# Patient Record
Sex: MALE | Employment: OTHER | URBAN - METROPOLITAN AREA
[De-identification: names, ages, dates, MRNs, and addresses within clinical notes are randomized per-mention and may not be internally consistent; named-entity substitution may affect disease eponyms.]

---

## 2021-03-02 LAB — TSH SERPL-ACNC: 2.8 MIU/L (ref 0.3–4.2)

## 2022-01-01 ENCOUNTER — ANESTHESIA EVENT (OUTPATIENT)
Dept: GASTROENTEROLOGY | Facility: CLINIC | Age: 77
End: 2022-01-01
Payer: COMMERCIAL

## 2022-01-01 ENCOUNTER — TELEPHONE (OUTPATIENT)
Dept: GASTROENTEROLOGY | Facility: CLINIC | Age: 77
End: 2022-01-01
Payer: COMMERCIAL

## 2022-01-01 ENCOUNTER — HOSPITAL ENCOUNTER (OUTPATIENT)
Facility: CLINIC | Age: 77
Discharge: HOME | End: 2022-02-15
Attending: INTERNAL MEDICINE | Admitting: INTERNAL MEDICINE
Payer: COMMERCIAL

## 2022-01-01 ENCOUNTER — TRANSCRIBE ORDERS (OUTPATIENT)
Dept: OTHER | Age: 77
End: 2022-01-01
Payer: COMMERCIAL

## 2022-01-01 ENCOUNTER — DOCUMENTATION ONLY (OUTPATIENT)
Dept: SURGERY | Facility: CLINIC | Age: 77
End: 2022-01-01
Payer: COMMERCIAL

## 2022-01-01 ENCOUNTER — PATIENT OUTREACH (OUTPATIENT)
Dept: SURGERY | Facility: CLINIC | Age: 77
End: 2022-01-01
Payer: COMMERCIAL

## 2022-01-01 ENCOUNTER — ANESTHESIA (OUTPATIENT)
Dept: GASTROENTEROLOGY | Facility: CLINIC | Age: 77
End: 2022-01-01
Payer: COMMERCIAL

## 2022-01-01 ENCOUNTER — TRANSFERRED RECORDS (OUTPATIENT)
Dept: HEALTH INFORMATION MANAGEMENT | Facility: CLINIC | Age: 77
End: 2022-01-01
Payer: COMMERCIAL

## 2022-01-01 ENCOUNTER — TELEPHONE (OUTPATIENT)
Dept: GASTROENTEROLOGY | Facility: CLINIC | Age: 77
End: 2022-01-01

## 2022-01-01 ENCOUNTER — MEDICAL CORRESPONDENCE (OUTPATIENT)
Dept: HEALTH INFORMATION MANAGEMENT | Facility: CLINIC | Age: 77
End: 2022-01-01
Payer: COMMERCIAL

## 2022-01-01 ENCOUNTER — PRE VISIT (OUTPATIENT)
Dept: SURGERY | Facility: CLINIC | Age: 77
End: 2022-01-01
Payer: COMMERCIAL

## 2022-01-01 ENCOUNTER — VIRTUAL VISIT (OUTPATIENT)
Dept: SURGERY | Facility: CLINIC | Age: 77
End: 2022-01-01
Attending: INTERNAL MEDICINE
Payer: COMMERCIAL

## 2022-01-01 ENCOUNTER — PATIENT OUTREACH (OUTPATIENT)
Dept: GASTROENTEROLOGY | Facility: CLINIC | Age: 77
End: 2022-01-01
Payer: COMMERCIAL

## 2022-01-01 ENCOUNTER — TELEPHONE (OUTPATIENT)
Dept: ONCOLOGY | Facility: CLINIC | Age: 77
End: 2022-01-01
Payer: COMMERCIAL

## 2022-01-01 VITALS
DIASTOLIC BLOOD PRESSURE: 62 MMHG | OXYGEN SATURATION: 97 % | TEMPERATURE: 97.9 F | SYSTOLIC BLOOD PRESSURE: 100 MMHG | HEART RATE: 70 BPM | HEIGHT: 70 IN | WEIGHT: 171.08 LBS | BODY MASS INDEX: 24.49 KG/M2 | RESPIRATION RATE: 16 BRPM

## 2022-01-01 DIAGNOSIS — Z86.0100 HISTORY OF COLONIC POLYPS: ICD-10-CM

## 2022-01-01 DIAGNOSIS — R93.3 ABNORMAL FINDING ON GI TRACT IMAGING: Primary | ICD-10-CM

## 2022-01-01 DIAGNOSIS — Z11.59 ENCOUNTER FOR SCREENING FOR OTHER VIRAL DISEASES: Primary | ICD-10-CM

## 2022-01-01 DIAGNOSIS — K86.89 PANCREATIC MASS: Primary | ICD-10-CM

## 2022-01-01 DIAGNOSIS — C25.0 MALIGNANT NEOPLASM OF HEAD OF PANCREAS (H): Primary | ICD-10-CM

## 2022-01-01 DIAGNOSIS — C25.9 PANCREATIC ADENOCARCINOMA (H): Primary | ICD-10-CM

## 2022-01-01 LAB
ALT SERPL-CCNC: 40 U/L (ref 7–55)
AST SERPL-CCNC: 33 U/L (ref 8–48)
COLONOSCOPY: NORMAL
CREATININE (EXTERNAL): 0.54 MG/DL (ref 0.74–1.35)
GFR ESTIMATED (EXTERNAL): >90 ML/MIN/BSA
GFR ESTIMATED (IF AFRICAN AMERICAN) (EXTERNAL): >90 ML/MIN/BSA
GLUCOSE (EXTERNAL): 109 MG/DL (ref 70–140)
INR (EXTERNAL): 2.2
INR PPP: 1.21 (ref 0.85–1.15)
PATH REPORT.COMMENTS IMP SPEC: ABNORMAL
PATH REPORT.COMMENTS IMP SPEC: NORMAL
PATH REPORT.COMMENTS IMP SPEC: NORMAL
PATH REPORT.COMMENTS IMP SPEC: YES
PATH REPORT.FINAL DX SPEC: ABNORMAL
PATH REPORT.FINAL DX SPEC: NORMAL
PATH REPORT.GROSS SPEC: ABNORMAL
PATH REPORT.GROSS SPEC: NORMAL
PATH REPORT.MICROSCOPIC SPEC OTHER STN: ABNORMAL
PATH REPORT.MICROSCOPIC SPEC OTHER STN: NORMAL
PATH REPORT.RELEVANT HX SPEC: ABNORMAL
PATH REPORT.RELEVANT HX SPEC: NORMAL
PHOTO IMAGE: NORMAL
POTASSIUM (EXTERNAL): 3.9 MMOL/L (ref 3.6–5.2)
UPPER EUS: NORMAL

## 2022-01-01 PROCEDURE — 88305 TISSUE EXAM BY PATHOLOGIST: CPT | Mod: TC | Performed by: INTERNAL MEDICINE

## 2022-01-01 PROCEDURE — 250N000009 HC RX 250: Performed by: ANESTHESIOLOGY

## 2022-01-01 PROCEDURE — 370N000017 HC ANESTHESIA TECHNICAL FEE, PER MIN: Performed by: INTERNAL MEDICINE

## 2022-01-01 PROCEDURE — 88305 TISSUE EXAM BY PATHOLOGIST: CPT | Mod: 26 | Performed by: PATHOLOGY

## 2022-01-01 PROCEDURE — 45380 COLONOSCOPY AND BIOPSY: CPT | Performed by: INTERNAL MEDICINE

## 2022-01-01 PROCEDURE — 258N000003 HC RX IP 258 OP 636: Performed by: ANESTHESIOLOGY

## 2022-01-01 PROCEDURE — 36415 COLL VENOUS BLD VENIPUNCTURE: CPT | Performed by: INTERNAL MEDICINE

## 2022-01-01 PROCEDURE — 88172 CYTP DX EVAL FNA 1ST EA SITE: CPT | Mod: 26 | Performed by: PATHOLOGY

## 2022-01-01 PROCEDURE — 250N000011 HC RX IP 250 OP 636: Performed by: ANESTHESIOLOGY

## 2022-01-01 PROCEDURE — 88173 CYTOPATH EVAL FNA REPORT: CPT | Mod: 26 | Performed by: PATHOLOGY

## 2022-01-01 PROCEDURE — 43242 EGD US FINE NEEDLE BX/ASPIR: CPT | Performed by: INTERNAL MEDICINE

## 2022-01-01 PROCEDURE — 43238 EGD US FINE NEEDLE BX/ASPIR: CPT | Performed by: INTERNAL MEDICINE

## 2022-01-01 PROCEDURE — 85610 PROTHROMBIN TIME: CPT | Performed by: INTERNAL MEDICINE

## 2022-01-01 PROCEDURE — 45385 COLONOSCOPY W/LESION REMOVAL: CPT | Performed by: INTERNAL MEDICINE

## 2022-01-01 PROCEDURE — 99204 OFFICE O/P NEW MOD 45 MIN: CPT | Mod: 95 | Performed by: SURGERY

## 2022-01-01 RX ORDER — NALOXONE HYDROCHLORIDE 0.4 MG/ML
0.2 INJECTION, SOLUTION INTRAMUSCULAR; INTRAVENOUS; SUBCUTANEOUS
Status: CANCELLED | OUTPATIENT
Start: 2022-01-01

## 2022-01-01 RX ORDER — ONDANSETRON 2 MG/ML
4 INJECTION INTRAMUSCULAR; INTRAVENOUS EVERY 30 MIN PRN
Status: CANCELLED | OUTPATIENT
Start: 2022-01-01

## 2022-01-01 RX ORDER — MEPERIDINE HYDROCHLORIDE 25 MG/ML
12.5 INJECTION INTRAMUSCULAR; INTRAVENOUS; SUBCUTANEOUS
Status: CANCELLED | OUTPATIENT
Start: 2022-01-01

## 2022-01-01 RX ORDER — GLYCOPYRROLATE 0.2 MG/ML
INJECTION, SOLUTION INTRAMUSCULAR; INTRAVENOUS PRN
Status: DISCONTINUED | OUTPATIENT
Start: 2022-01-01 | End: 2022-01-01

## 2022-01-01 RX ORDER — LIDOCAINE HYDROCHLORIDE 20 MG/ML
INJECTION, SOLUTION INFILTRATION; PERINEURAL PRN
Status: DISCONTINUED | OUTPATIENT
Start: 2022-01-01 | End: 2022-01-01

## 2022-01-01 RX ORDER — OXYCODONE HYDROCHLORIDE 5 MG/1
5 TABLET ORAL EVERY 4 HOURS PRN
Status: CANCELLED | OUTPATIENT
Start: 2022-01-01

## 2022-01-01 RX ORDER — ONDANSETRON 2 MG/ML
4 INJECTION INTRAMUSCULAR; INTRAVENOUS EVERY 6 HOURS PRN
Status: CANCELLED | OUTPATIENT
Start: 2022-01-01

## 2022-01-01 RX ORDER — ONDANSETRON 4 MG/1
4 TABLET, ORALLY DISINTEGRATING ORAL EVERY 30 MIN PRN
Status: CANCELLED | OUTPATIENT
Start: 2022-01-01

## 2022-01-01 RX ORDER — PROPOFOL 10 MG/ML
INJECTION, EMULSION INTRAVENOUS CONTINUOUS PRN
Status: DISCONTINUED | OUTPATIENT
Start: 2022-01-01 | End: 2022-01-01

## 2022-01-01 RX ORDER — SODIUM CHLORIDE, SODIUM LACTATE, POTASSIUM CHLORIDE, CALCIUM CHLORIDE 600; 310; 30; 20 MG/100ML; MG/100ML; MG/100ML; MG/100ML
INJECTION, SOLUTION INTRAVENOUS CONTINUOUS
Status: CANCELLED | OUTPATIENT
Start: 2022-01-01

## 2022-01-01 RX ORDER — FLUMAZENIL 0.1 MG/ML
0.2 INJECTION, SOLUTION INTRAVENOUS
Status: CANCELLED | OUTPATIENT
Start: 2022-01-01 | End: 2022-01-01

## 2022-01-01 RX ORDER — HYDROMORPHONE HCL IN WATER/PF 6 MG/30 ML
0.2 PATIENT CONTROLLED ANALGESIA SYRINGE INTRAVENOUS EVERY 5 MIN PRN
Status: CANCELLED | OUTPATIENT
Start: 2022-01-01

## 2022-01-01 RX ORDER — ONDANSETRON 4 MG/1
4 TABLET, ORALLY DISINTEGRATING ORAL EVERY 6 HOURS PRN
Status: CANCELLED | OUTPATIENT
Start: 2022-01-01

## 2022-01-01 RX ORDER — SODIUM CHLORIDE, SODIUM LACTATE, POTASSIUM CHLORIDE, CALCIUM CHLORIDE 600; 310; 30; 20 MG/100ML; MG/100ML; MG/100ML; MG/100ML
INJECTION, SOLUTION INTRAVENOUS CONTINUOUS PRN
Status: DISCONTINUED | OUTPATIENT
Start: 2022-01-01 | End: 2022-01-01

## 2022-01-01 RX ORDER — PROPOFOL 10 MG/ML
INJECTION, EMULSION INTRAVENOUS PRN
Status: DISCONTINUED | OUTPATIENT
Start: 2022-01-01 | End: 2022-01-01

## 2022-01-01 RX ORDER — ATORVASTATIN CALCIUM 20 MG/1
20 TABLET, FILM COATED ORAL DAILY
COMMUNITY

## 2022-01-01 RX ORDER — WARFARIN SODIUM 4 MG/1
4 TABLET ORAL DAILY
COMMUNITY

## 2022-01-01 RX ORDER — NALOXONE HYDROCHLORIDE 0.4 MG/ML
0.4 INJECTION, SOLUTION INTRAMUSCULAR; INTRAVENOUS; SUBCUTANEOUS
Status: CANCELLED | OUTPATIENT
Start: 2022-01-01

## 2022-01-01 RX ORDER — FENTANYL CITRATE 50 UG/ML
25 INJECTION, SOLUTION INTRAMUSCULAR; INTRAVENOUS EVERY 5 MIN PRN
Status: CANCELLED | OUTPATIENT
Start: 2022-01-01

## 2022-01-01 RX ORDER — SOTALOL HYDROCHLORIDE 80 MG/1
80 TABLET ORAL
COMMUNITY

## 2022-01-01 RX ORDER — LIDOCAINE 40 MG/G
CREAM TOPICAL
Status: DISCONTINUED | OUTPATIENT
Start: 2022-01-01 | End: 2022-01-01 | Stop reason: HOSPADM

## 2022-01-01 RX ORDER — FENTANYL CITRATE 50 UG/ML
25 INJECTION, SOLUTION INTRAMUSCULAR; INTRAVENOUS
Status: CANCELLED | OUTPATIENT
Start: 2022-01-01

## 2022-01-01 RX ADMIN — PROPOFOL 20 MG: 10 INJECTION, EMULSION INTRAVENOUS at 11:01

## 2022-01-01 RX ADMIN — GLYCOPYRROLATE 0.2 MG: 0.2 INJECTION, SOLUTION INTRAMUSCULAR; INTRAVENOUS at 10:53

## 2022-01-01 RX ADMIN — PROPOFOL 150 MCG/KG/MIN: 10 INJECTION, EMULSION INTRAVENOUS at 10:09

## 2022-01-01 RX ADMIN — PROPOFOL 20 MG: 10 INJECTION, EMULSION INTRAVENOUS at 10:53

## 2022-01-01 RX ADMIN — GLYCOPYRROLATE 0.2 MG: 0.2 INJECTION, SOLUTION INTRAMUSCULAR; INTRAVENOUS at 10:49

## 2022-01-01 RX ADMIN — TOPICAL ANESTHETIC 1 SPRAY: 200 SPRAY DENTAL; PERIODONTAL at 10:03

## 2022-01-01 RX ADMIN — PHENYLEPHRINE HYDROCHLORIDE 100 MCG: 10 INJECTION INTRAVENOUS at 10:48

## 2022-01-01 RX ADMIN — PROPOFOL 20 MG: 10 INJECTION, EMULSION INTRAVENOUS at 10:38

## 2022-01-01 RX ADMIN — PROPOFOL 50 MG: 10 INJECTION, EMULSION INTRAVENOUS at 10:10

## 2022-01-01 RX ADMIN — SODIUM CHLORIDE, POTASSIUM CHLORIDE, SODIUM LACTATE AND CALCIUM CHLORIDE: 600; 310; 30; 20 INJECTION, SOLUTION INTRAVENOUS at 10:08

## 2022-01-01 RX ADMIN — LIDOCAINE HYDROCHLORIDE 60 MG: 20 INJECTION, SOLUTION INFILTRATION; PERINEURAL at 10:03

## 2022-01-01 ASSESSMENT — ENCOUNTER SYMPTOMS: DYSRHYTHMIAS: 1

## 2022-01-01 ASSESSMENT — MIFFLIN-ST. JEOR: SCORE: 1522.25

## 2022-01-01 ASSESSMENT — COPD QUESTIONNAIRES: COPD: 1

## 2022-02-04 NOTE — CONFIDENTIAL NOTE
Advanced Endoscopy     Referring provider: Dr. Adam Ahn @ [facility] Ph: [phone]    Referred to: Advanced Endoscopy Provider Group     Provider Requested: Xu     Referral Received: 2/4/22     Records received: Media tab  CT 2/2/22  Impression:  1. Highly suspicous, approximately 4.6cm X 4.5cm low attenuation irregular shaped mass spicentered within the pancreatic head/uncinate process which partially encases portions right sided aspect of the SMA and adjacent proximal splenic vein. Moderate prominence of the mid, distal pancreatic duct. This is highly concerning for probable pancreatic neoplasm    2. Low - attenuation lobular is soft tissue mass measuring approximately 1.5cm X 1.5cm along superior posterior aspect of the left prostate labs concerning for possible developing prostate neoplasm.    3. Located within the proximal portion of the ascending colon there is a segment of colon which is moderately circumferentially narrowed without discrete mass, extending over approximately a 3-4cm length, favor probable decompressed lumen alternatively possible developing colonic neoplasm is difficult to confidently exclude. If clinically indicated could consider correlation with contemporaneous colonoscopy      Images received: PACs    Evaluation for: pancreas mass     Clinical History (per RN review):     MD review date:   MD Decision for clinic consultation/Orders:            Referral updates/Patient contacted:

## 2022-02-04 NOTE — PROGRESS NOTES
Called to discuss referral with patient, spoke with wife. Wife states that the patient is not symptomatic other then he has had some weight loss recently. Dr Norris referral to Dr Mcnair for possible EUS.     Explained they can expect a call from  for date and time of procedure, will need a , someone to stay with them for 24 hours and should stay in town for 24 hours (within 45 min of Hospital) post procedure    Patient needs to get pre-op physical completed. If outside  health system will need physical faxed to number [phone]   If you do not get a preop physical, your procedure could be cancelled, patient voiced understanding*    Preop Plan: Dr Garcia at [place], recent office visit/annual physical exam - being faxed    Med Review    Blood thinner -  Warfarin, guidelines to hold 5 days prior to procedure. Pt has a cardiology appt on 2/7 and will ask for guidance on holding/bridging  ASA -   Diabetic -     COVID test discussed: yes, needed within 4 days of procedure    Patient Education r/t procedure: letter to be sent to home, address confirmed    Waiting to hear from Dr Mcnair on official plan per referral.     Pt wife is wondering if his colonoscopy is also due if that can be done at the same time. He is currently scheduled on 2/24 at [facility] Dr Enriquez.    Soumya Moreno, RN, BSN,   Advanced Gastroenterology  Care coordinator

## 2022-02-07 NOTE — PROGRESS NOTES
Called pt and spoke with him and his wife.  EUS and colonoscopy with MAC in [facility] with Dr Mcnair, date of 2/15 available     Pt saw Gena Max, his cardiologist today, but did not get an answer for the warfarin or the bridging. Called made to [facility] cardiology regarding the warfarin, ok to hold for 5 days per his PCP. No mention of bridging needed. Cardiology clinic agreed with these recommendations EUS and colon ordered, message routed to Endo     Pt has the Golytely prep meds/information already (the colon had been previously scheduled down in [place] for later this month)  Recent physical exam with his PCP on 1/29, note should be in the media tab (sent to scanning today)  Discussed need for COVID test within 4 days of procedure.               - - -

## 2022-02-08 NOTE — TELEPHONE ENCOUNTER
02/08/2022:    PER DR GRUBER    Yes - this pt (Lew) should take priority and be done on 2/15.     Lew needs to be scheduled on 2/15, move cases up over my lunch time to get this done.     Thanks   MICHAEL Gruber MD   Professor of Medicine   Division of Gastroenterology, Hepatology and Nutrition   [facility]   ---------------------------------------------------------------------------    Deisy KILLIAN,     Shell and I have spoke.     Amrit Hackett has been moved to 02/15/2022 -- Pt has been contacted, no answer/a detailed voicemail message has been left.     Pt's case will run over Dr Gruber's lunch by a half hour and we have shifted additional EUS cases to accommodate his lunch.     Thank you,     Laura TSANG   Endoscopy Scheduling Team

## 2022-02-08 NOTE — TELEPHONE ENCOUNTER
Screening Questions  Blue=prep questions Red=location Green=sedation   1. Are you active on mychart? No     2. What insurance is in the chart? [facility]    3.  Ordering/Referring Provider: Cristian Mcnair MD    4. BMI 24.1, If greater than 40 review exclusion criteria also will need EXTENDED PREP    5.  Respiratory Screening (If yes to any of the following HOSPITAL setting only):     Do you use daily home oxygen? N  Do you have mod to severe Obstructive Sleep Apnea? N (can be seen at [facility] or hospital setting)    Do you have Pulmonary Hypertension? N   Do you have UNCONTROLLED asthma? N    6. Have you had a heart or lung transplant? N  (If yes, please review exclusion criteria)    7. Are you currently on dialysis?N  (If yes, schedule in HOSPITAL setting only)(If yes, please send Golytely prep)    8. Do you have chronic kidney disease? N (If yes, please send Golytely prep)    9. Have you had a stroke or Transient ischemic attack (TIA) within 6 months? N (If yes, do not schedule at [facility])    10. In the past 6 months, have you had any heart related issues including cardiomyopathy or heart attack? N (If yes, please review exclusion criteria)           If yes, did it require cardiac stenting or other implantable device?N  (If yes, please review exclusion criteria)      11. Do you have any implantable devices in your body (pacemaker, defib, LVAD)? N (If yes, schedule at [facility])    12. Do you take nitroglycerin? If yes, how often? N (if yes, schedule at HOSPITAL setting)    13. Are you currently taking any blood thinners?   Y - BLOOD THINNER  (If yes- inform patient to follow up with PCP or provider for follow up instructions)     14. Are you a diabetic? N (If yes, please send Golytely prep)    15. (Females) Are you currently pregnant?   If yes, how many weeks?      16. Are you taking any prescription pain medications on a routine schedule? N If yes, MAC sedation and patient will need EXTENDED PREP.    17. Do you have any  chemical dependencies such as alcohol, street drugs, or methadone? N If yes, MAC sedation     18. Do you have any history of post-traumatic stress syndrome, severe anxiety or history of psychosis? N  If yes, MAC sedation.     19. Do you transfer independently? Y    20.  Do you have any issues with constipation? N   If yes, pt will need EXTENDED PREP     21. Preferred Pharmacy for Pre Prescription [facility]/ ON CHART     Scheduling Details    Which Colonoscopy Prep was Sent?:    Type of Procedure Scheduled: EUS/COLONOSCOY   Surgeon: ALLYN   Date of Procedure: 02/22/2022  Location: [facility]  Caller (Please ask for phone number if not scheduled by patient): ALBINA/WIFE       Sedation Type: MAC  Conscious Sedation- Needs  for 6 hours after the procedure  MAC/General-Needs  for 24 hours after procedure    Pre-op Required at [facility], [place], [place] and OR for MAC sedation: Y - PT WILL FAX RESULTS   (if yes advise patient they will need a pre-op prior to procedure)      Informed patient they will need an adult  Y  Cannot take any type of public or medical transportation alone    Pre-Procedure Covid test to be completed at [facility] or Externally: Y - PT WILL FAX RESULTS     Confirmed Nurse will call to complete assessment Y    Additional comments: N (DE GROEN'S PATIENTS NEED EXTENDED PREP)

## 2022-02-09 NOTE — TELEPHONE ENCOUNTER
Pre assessment questions completed for upcoming colonoscopy/EUS procedure scheduled on 2/15/22    COVID test scheduled - 2/11/22 - in [place]. Gave fax number to send results [phone].    Reviewed procedural arrival time 0945 and facility location [facility].    Designated  policy reviewed. Instructed to have someone stay 24 hours post procedure.     Reviewed Golytely  prep instructions with patient in great detail. No fiber/iron supplements or foods that contain nuts/seeds 7 days prior to procedure.     Anticoagulation/blood thinners? Warfarin holding starting 2/10/22. No bridging.     Electronic implanted devices? no    Patient verbalized understanding and had no questions or concerns at this time.    Abby Zuniga RN

## 2022-02-09 NOTE — TELEPHONE ENCOUNTER
Patient scheduled for colonoscopy/EUS on 2/15/22.     Covid test scheduled? Externally?     Pre op exam scheduled:  Recent physical exam with PCP on 1/29/22 is going to be used for pre op per patient outreach encounter dated 2/4/22.    Arrival time: 0945    Facility location: [facility]    Sedation type: MAC    Indication for procedure: history of polyps and pancreas mass.     Anticoagulations? warfarin Holding interval of 5 days. No bridging per patient outreach encounter dated 2/4/22    Bowel prep recommendation: Golytely. Patient already has this prep from the [facility]. Verify that has container and dulcolax tablets. Prep instructions sent via letter yesterday 2/8/22.    Pre visit planning completed.    Abby Zuniga RN

## 2022-02-09 NOTE — TELEPHONE ENCOUNTER
Attempted to contact patient for pre assessment questions. Patient is busy and requesting to call back later     Gave return call number [phone] #2    Abby Zuniga RN

## 2022-02-15 NOTE — ANESTHESIA CARE TRANSFER NOTE
Patient: Amrit Hackett    Procedure: Procedure(s):  ESOPHAGOGASTRODUODENOSCOPY, WITH FINE NEEDLE ASPIRATION BIOPSY, WITH ENDOSCOPIC ULTRASOUND GUIDANCE  COLONOSCOPY       Diagnosis: Abnormal finding on GI tract imaging [R93.3]  History of colonic polyps [Z86.010]  Diagnosis Additional Information: No value filed.    Anesthesia Type:   MAC     Note:    Oropharynx: oropharynx clear of all foreign objects and spontaneously breathing  Level of Consciousness: awake  Oxygen Supplementation: nasal cannula  Level of Supplemental Oxygen (L/min / FiO2): 2  Independent Airway: airway patency satisfactory and stable  Dentition: dentition unchanged  Vital Signs Stable: post-procedure vital signs reviewed and stable  Report to RN Given: handoff report given  Patient transferred to: Phase II    Handoff Report: Identifed the Patient, Identified the Reponsible Provider, Reviewed the pertinent medical history, Discussed the surgical course, Reviewed Intra-OP anesthesia mangement and issues during anesthesia, Set expectations for post-procedure period and Allowed opportunity for questions and acknowledgement of understanding      Vitals:  Vitals Value Taken Time   BP 96/39    Temp 98.2    Pulse 71    Resp 14    SpO2 99        Electronically Signed By: CHARLOTTE Jimenez CRNA  February 15, 2022  11:23 AM

## 2022-02-15 NOTE — ANESTHESIA POSTPROCEDURE EVALUATION
Patient: Amrit Hackett    Procedure: Procedure(s):  ESOPHAGOGASTRODUODENOSCOPY, WITH FINE NEEDLE ASPIRATION BIOPSY, WITH ENDOSCOPIC ULTRASOUND GUIDANCE  COLONOSCOPY       Diagnosis:Abnormal finding on GI tract imaging [R93.3]  History of colonic polyps [Z86.010]  Diagnosis Additional Information: No value filed.    Anesthesia Type:  MAC    Note:  Disposition: Outpatient   Postop Pain Control: Uneventful            Sign Out: Well controlled pain   PONV: No   Neuro/Psych: Uneventful            Sign Out: Acceptable/Baseline neuro status   Airway/Respiratory: Uneventful            Sign Out: Acceptable/Baseline resp. status   CV/Hemodynamics: Uneventful            Sign Out: Acceptable CV status   Other NRE: NONE   DID A NON-ROUTINE EVENT OCCUR? No           Last vitals:  Vitals Value Taken Time   BP     Temp     Pulse     Resp     SpO2         Electronically Signed By: Christopher J. Behrens, MD  February 15, 2022  11:36 AM

## 2022-02-15 NOTE — ANESTHESIA PREPROCEDURE EVALUATION
Anesthesia Pre-Procedure Evaluation    Patient: Amrit Hackett   MRN: [id number] : 1945        Preoperative Diagnosis: Abnormal finding on GI tract imaging [R93.3]  History of colonic polyps [Z86.010]    Procedure : Procedure(s):  ENDOSCOPIC ULTRASOUND, ESOPHAGOSCOPY / UPPER GASTROINTESTINAL TRACT (GI)  COLONOSCOPY          No past medical history on file.   No past surgical history on file.   Not on File   Social History     Tobacco Use     Smoking status: Not on file     Smokeless tobacco: Not on file   Substance Use Topics     Alcohol use: Not on file      Wt Readings from Last 1 Encounters:   No data found for Wt        Anesthesia Evaluation            ROS/MED HX  ENT/Pulmonary:     (+) COPD,     Neurologic:       Cardiovascular: Comment: ECHO:3/3/2021  Final Impressions  1. Normal left ventricular chamber size.  2. Calculated 2-D biplane volumetric left ventricular ejection fraction 52 %.  3. No regional wall motion abnormalities.  4. Normal right ventricular chamber size.  Normal right ventricular function.  5. No significant valvular heart disease.        (+) -----CHF dysrhythmias, a-fib,     METS/Exercise Tolerance:     Hematologic:       Musculoskeletal:       GI/Hepatic:       Renal/Genitourinary:       Endo:       Psychiatric/Substance Use:       Infectious Disease:       Malignancy:       Other:               OUTSIDE LABS:  CBC: No results found for: WBC, HGB, HCT, PLT  BMP: No results found for: NA, POTASSIUM, CHLORIDE, CO2, BUN, CR, GLC  COAGS: No results found for: PTT, INR, FIBR  POC: No results found for: BGM, HCG, HCGS  HEPATIC: No results found for: ALBUMIN, PROTTOTAL, ALT, AST, GGT, ALKPHOS, BILITOTAL, BILIDIRECT, RAVINDER  OTHER: No results found for: PH, LACT, A1C, GIRISH, PHOS, MAG, LIPASE, AMYLASE, TSH, T4, T3, CRP, SED    Anesthesia Plan    ASA Status:  3      Anesthesia Type: MAC.   Induction: Intravenous.   Maintenance: Balanced.        Consents    Anesthesia Plan(s) and associated  risks, benefits, and realistic alternatives discussed. Questions answered and patient/representative(s) expressed understanding.    - Discussed:     - Discussed with:  Patient      - Extended Intubation/Ventilatory Support Discussed: No.      - Patient is DNR/DNI Status: No    Use of blood products discussed: Yes.     - Discussed with: Patient.     - Consented: consented to blood products            Reason for refusal: other.     Postoperative Care    Pain management: IV analgesics.   PONV prophylaxis: Ondansetron (or other 5HT-3)     Comments:                Christopher J. Behrens, MD

## 2022-02-15 NOTE — OR NURSING
Pt had EGD/EUS with FNA under MAC, as well as colonoscopy with polypectomy under MAC. Pt tolerated both procedures very well. Pt stable at time of transfer to 3C recovery. Procedural report given to Ailyn 3C recovery BENITO.

## 2022-02-17 NOTE — TELEPHONE ENCOUNTER
[facility] Call Center    Phone Message    May a detailed message be left on voicemail: yes     Reason for Call: Symptoms or Concerns     If patient has red-flag symptoms, warm transfer to triage line    Current symptom or concern: Diarrhea    Symptoms have been present for:  2 day(s)    Has patient previously been seen for this? No    Are there any new or worsening symptoms? No    NOTE:  Patient received a call but, no messages were left as patient's vmail has not been set up yet.      Action Taken: Message routed to:  [facility] Ivis Rincon Team     Travel Screening: Not Applicable

## 2022-02-17 NOTE — CONFIDENTIAL NOTE
Called pt back in response to call center message below. Pt had diarrhea yesterday afternoon, then one soft/formed BM. Then this morning had another bout of diarrhea. Says it is getting better. No blood in stool.  Drinking enough, staying hydrated. Encouraged him to have a bland diet, to keep up the fluid intake. He reports no nauseat, no c/o pain. Did get some exercise on his bike today. Pt understands if he becomes nauseated or has pain to call back     Informed pt that he would be getting results from the biopsy from Dr Xu Moreno, RN, BSN,   Advanced Gastroenterology  Care coordinator

## 2022-02-17 NOTE — TELEPHONE ENCOUNTER
Message received from RNCC:    Please fax the procedure report and pathology from Dr Mcnair on 2/15 to the [facility] Fax #[phone]       Requested records faxed.      Zaida Whitaker CMA on 2/17/2022 at 2:15 PM

## 2022-02-18 NOTE — TELEPHONE ENCOUNTER
Final cytology from EUS biopsy of pancreatic mass was positive for adenoca.    Called and discussed with pt. Spoke with referring MD.    Dr. Ahn will contact me if wants official surgical oncology consultation with our team.    MICHAEL Mcnair MD  Professor of Medicine  Division of Gastroenterology, Hepatology and Nutrition  [facility]

## 2022-02-24 NOTE — PROGRESS NOTES
New Patient Oncology Nurse Navigator Note     Referring provider: Dr. Ahn     Referring Clinic/Organization: [facility]      Referred to: Surgical Oncology -  Hepatobiliary / GI Cancers     Requested provider (if applicable): First available provider    Referral Received: 02/24/22       Evaluation for : Pancreas Cancer      Clinical History (per Nurse review of records provided):      See book marked documents:       Referring MD office note    Pathology report    Imaging reports     Procedure report     Past Surgical History:   Procedure Laterality Date     COLONOSCOPY N/A 2/15/2022    Procedure: COLONOSCOPY, WITH POLYPECTOMY AND BIOPSY;  Surgeon: Cristian Mcnair MD;  Location:  GI     ESOPHAGOSCOPY, GASTROSCOPY, DUODENOSCOPY (EGD), COMBINED N/A 2/15/2022    Procedure: ESOPHAGOGASTRODUODENOSCOPY, WITH FINE NEEDLE ASPIRATION BIOPSY, WITH ENDOSCOPIC ULTRASOUND GUIDANCE;  Surgeon: Cristian Mcnair MD;  Location:  GI       Current Outpatient Medications   Medication Sig Dispense Refill     atorvastatin (LIPITOR) 20 MG tablet Take 20 mg by mouth daily       sotalol (BETAPACE) 80 MG tablet Take 80 mg by mouth       warfarin ANTICOAGULANT (COUMADIN) 4 MG tablet Take 4 mg by mouth daily           No Known Allergies       There is no problem list on file for this patient.        Clinical Assessment / Barriers to Care (Per Nurse):    None at this time.     Records Location:     [facility]   Faxed - Media tab/Scanned     Records Needed:     NONE AT THIS TIME      Additional testing needed prior to consult:     NONE AT THIS TIME    Referral updates and Plan:       Consult with Surgical Oncology     Hanna Tan, RN, BSN   Surgical Oncology New Patient Nurse Navigator  [facility]  [phone]

## 2022-03-02 NOTE — TELEPHONE ENCOUNTER
RECORDS STATUS - ALL OTHER DIAGNOSIS      RECORDS RECEIVED FROM: [facility]   DATE RECEIVED: 3/2   NOTES STATUS DETAILS   OFFICE NOTE from referring provider Cristian Lindsey MD in [facility]   DISCHARGE SUMMARY from hospital [facility] 2/15/22, 2/3/22   OPERATIVE REPORT Epic 2/15/22: EGD   MEDICATION LIST [facility]    LABS     PATHOLOGY REPORTS [facility] 2/15/22: Surg Path/FNA   ANYTHING RELATED TO DIAGNOSIS Epic 2/15/22   IMAGING (NEED IMAGES & REPORT)     CT SCANS PACS 2/2/22: [facility]

## 2022-03-31 NOTE — PROGRESS NOTES
Action March 31, 2022 2:06 PM ABT   Action Taken Called and spoke Mary at [facility] and requested for Dr. Ahn onc notes, she states that they will work on getting recs over today.    3:25 PM  Records from [facility] received and sent to HIM for upload

## 2022-04-01 NOTE — LETTER
4/1/2022     RE: Amrit Hackett  [address]  [place] [address]    Dear Colleague,    Thank you for referring your patient, Amrit Hackett, to the [facility]. Please see a copy of my visit note below.    Amrit is a 76 year old who is being evaluated via a billable video visit.      How would you like to obtain your AVS? MyChart  If the video visit is dropped, the invitation should be resent by: Text to cell phone: [phone]  Will anyone else be joining your video visit? No      Video Start Time: 3:03 PM    Video-Visit Details    Type of service: Video Visit    Video End Time: 3:25 PM    Originating Location (pt. 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  His only symptom was weight loss.  He had cross sectional imaging showing the mass with encasement of the PV/SMV axis and the SMA.  He has seen medical oncology and has started on systemic therapy.  He did not tolerate the second treatment well and was in the hospital for a week.  He is currently getting a break from chemotherapy for the next 3 weeks.  He now presents to discuss the possible role of surgery in management.    Of Note: He is feeling rather well and has really improved since being hospitalized.    FNA Pathology (2/15/2022): Positive for malignancy.  Adenocarcinoma.    Cross sectional imaging was review by me and shows a tumor with complete encasement of the SMV/PV axis with resultant long segment occlusion past the first order branches.  In addition, there is at least 180 degree involvement of the SMA.    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  His only symptom was weight loss.  He had cross sectional imaging showing the mass with encasement of  the PV/SMV axis and the SMA.  He has seen medical oncology and has started on systemic therapy.  He did not tolerate the second treatment well and was in the hospital for a week.  He is currently getting a break from chemotherapy for the next 3 weeks.  He now presents to discuss the possible role of surgery in management.  We discussed the situation and pancreatic cancer biology and natural history.  We discussed the very high recurrence rates and low rates of cure even with chemotherapy and surgery.  In his case, he has locally advanced, unresectable disease due to PV/SMV and SMA involvement.  Even with chemotherapy, I unfortunately think it is highly unlikely that he would ever have a response that would render him a surgical candidate.  As such, I have recommended that he continue follow-up and treatment with medical oncology.  I think it would also be reasonable for him to meet with palliative care to discuss care goals and any symptoms that may need improved management.  Questions were answered and the patient was in agreement with and understanding of the plan.    A total of 45 minutes were spent on this encounter including 22 minutes in phone call duration and the remainder in imaging review, chart review, documentation, and coordination of care.    Again, thank you for allowing me to participate in the care of your patient.      Sincerely,    Eyad Craft MD

## 2022-04-01 NOTE — PROGRESS NOTES
Amrit is a 76 year old who is being evaluated via a billable video visit.      How would you like to obtain your AVS? [employer]"  If the video visit is dropped, the invitation should be resent by: Text to cell phone: [phone]  Will anyone else be joining your video visit? No      Video Start Time: 3:03 PM    Video-Visit Details    Type of service: Video Visit    Video End Time: 3:25 PM    Originating Location (pt. 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  His only symptom was weight loss.  He had cross sectional imaging showing the mass with encasement of the PV/SMV axis and the SMA.  He has seen medical oncology and has started on systemic therapy.  He did not tolerate the second treatment well and was in the hospital for a week.  He is currently getting a break from chemotherapy for the next 3 weeks.  He now presents to discuss the possible role of surgery in management.    Of Note: He is feeling rather well and has really improved since being hospitalized.    [facility] Pathology (2/15/2022): Positive for malignancy.  Adenocarcinoma.    Cross sectional imaging was review by me and shows a tumor with complete encasement of the SMV/PV axis with resultant long segment occlusion past the first order branches.  In addition, there is at least 180 degree involvement of the SMA.    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  His only symptom was weight loss.  He had cross sectional imaging showing the mass with encasement of the PV/SMV axis and the SMA.  He has seen medical oncology and has started on systemic therapy.  He did not tolerate the second treatment well and was in the hospital for a week.  He is currently getting a break from chemotherapy for the next 3  estefania.  He now presents to discuss the possible role of surgery in management.  We discussed the situation and pancreatic cancer biology and natural history.  We discussed the very high recurrence rates and low rates of cure even with chemotherapy and surgery.  In his case, he has locally advanced, unresectable disease due to PV/SMV and SMA involvement.  Even with chemotherapy, I unfortunately think it is highly unlikely that he would ever have a response that would render him a surgical candidate.  As such, I have recommended that he continue follow-up and treatment with medical oncology.  I think it would also be reasonable for him to meet with palliative care to discuss care goals and any symptoms that may need improved management.  Questions were answered and the patient was in agreement with and understanding of the plan.    A total of 45 minutes were spent on this encounter including 22 minutes in phone call duration and the remainder in imaging review, chart review, documentation, and coordination of care.